# Patient Record
Sex: MALE | Race: WHITE | NOT HISPANIC OR LATINO | URBAN - METROPOLITAN AREA
[De-identification: names, ages, dates, MRNs, and addresses within clinical notes are randomized per-mention and may not be internally consistent; named-entity substitution may affect disease eponyms.]

---

## 2017-11-25 ENCOUNTER — EMERGENCY (EMERGENCY)
Facility: HOSPITAL | Age: 42
LOS: 1 days | Discharge: HOME | End: 2017-11-25
Attending: EMERGENCY MEDICINE | Admitting: EMERGENCY MEDICINE
Payer: MEDICAID

## 2017-11-25 VITALS
WEIGHT: 160.06 LBS | OXYGEN SATURATION: 100 % | HEART RATE: 73 BPM | DIASTOLIC BLOOD PRESSURE: 78 MMHG | TEMPERATURE: 98 F | HEIGHT: 68 IN | RESPIRATION RATE: 18 BRPM | SYSTOLIC BLOOD PRESSURE: 112 MMHG

## 2017-11-25 DIAGNOSIS — H57.8 OTHER SPECIFIED DISORDERS OF EYE AND ADNEXA: ICD-10-CM

## 2017-11-25 PROCEDURE — 99283 EMERGENCY DEPT VISIT LOW MDM: CPT | Mod: 25

## 2017-11-25 NOTE — ED ADULT TRIAGE NOTE - CHIEF COMPLAINT QUOTE
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 Pt has redness and burning. Pt had conjunctivitis to the R eye a few days ago, no meds taken

## 2017-11-25 NOTE — ED PROVIDER NOTE - OBJECTIVE STATEMENT
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 denies tearing/pain. denies fb sensation.

## 2017-11-25 NOTE — ED PROVIDER NOTE - CHPI ED SYMPTOMS NEG
no eye lid swelling/no blurred vision/no itching/no photophobia/no purulent drainage/no drainage/no double vision/no pain/no foreign body/no discharge

## 2017-11-25 NOTE — ED ADULT NURSE NOTE - OBJECTIVE STATEMENT
states that his eye feels irritated for the past several days. no signs of distress. left before discharge paperwork provided.